# Patient Record
(demographics unavailable — no encounter records)

---

## 2025-07-16 NOTE — HISTORY OF PRESENT ILLNESS
[Mother] : mother [Normal] : normal [Painful Cramps] : painful cramps [Sleep Concerns] : sleep concerns [Grade: ____] : Grade: [unfilled] [Normal Performance] : normal performance [Normal Behavior/Attention] : normal behavior/attention [Normal Homework] : normal homework [Eats regular meals including adequate fruits and vegetables] : eats regular meals including adequate fruits and vegetables [Drinks non-sweetened liquids] : drinks non-sweetened liquids  [Calcium source] : calcium source [Has friends] : has friends [At least 1 hour of physical activity a day] : at least 1 hour of physical activity a day [Screen time (except homework) less than 2 hours a day] : screen time (except homework) less than 2 hours a day [Has interests/participates in community activities/volunteers] : has interests/participates in community activities/volunteers. [Uses safety belts/safety equipment] : uses safety belts/safety equipment  [Up to date] : Up to date [Eats meals with family] : eats meals with family [Yes] : Cigarette smoke exposure [No] : Patient has not had sexual intercourse. [With Teen] : teen [With Parent/Guardian] : parent/guardian [Heavy Bleeding] : no heavy bleeding [Uses electronic nicotine delivery system] : does not use electronic nicotine delivery system [Exposure to electronic nicotine delivery system] : no exposure to electronic nicotine delivery system [Uses tobacco] : does not use tobacco [Uses drugs] : does not use drugs  [Drinks alcohol] : does not drink alcohol [FreeTextEntry7] : 17 yr [facility] [de-identified] : Tennis, works at [employer] in [facility] [de-identified] : Struggling with anxiety, has initial appointment coming up with a counselor  [FreeTextEntry1] : Interval history:  Neuro- almost daily headaches, saw neuro about 3 years ago and no diagnosis was made.   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 Uses Combivent 15 min prior to sports and then PRN throughout. Patient states she may need to use albuterol twice during a tennis match. She was referred to Speech but mom is not sure where to go. Used albuterol nebs when she had PNA in the fall.   Cardio- saw Dr. Pond dx Mitral valve regurgitation  GI- Chronic constipation

## 2025-07-16 NOTE — DISCUSSION/SUMMARY
[Normal Growth] : growth [Normal Development] : development  [No Elimination Concerns] : elimination [Continue Regimen] : feeding [No Skin Concerns] : skin [Normal Sleep Pattern] : sleep [None] : no medical problems [Anticipatory Guidance Given] : Anticipatory guidance addressed as per the history of present illness section [Physical Growth and Development] : physical growth and development [Social and Academic Competence] : social and academic competence [Emotional Well-Being] : emotional well-being [Risk Reduction] : risk reduction [Violence and Injury Prevention] : violence and injury prevention [No Vaccines] : no vaccines needed [No Medications] : ~He/She~ is not on any medications [Patient] : patient [Parent/Guardian] : Parent/Guardian [Full Activity without restrictions including Physical Education & Athletics] : Full Activity without restrictions including Physical Education & Athletics [I have examined the above-named student and completed the preparticipation physical evaluation. The athlete does not present apparent clinical contraindications to practice and participate in sport(s) as outlined above. A copy of the physical exam is on r] : I have examined the above-named student and completed the preparticipation physical evaluation. The athlete does not present apparent clinical contraindications to practice and participate in sport(s) as outlined above. A copy of the physical exam is on record in my office and can be made available to the school at the request of the parents. If conditions arise after the athlete has been cleared for participation, the physician may rescind the clearance until the problem is resolved and the potential consequences are completely explained to the athlete (and parents/guardians). [Met privately with the adolescent for part of the office visit?] : Met privately with the adolescent for part of the office visit? Yes [Adolescent demonstrates understanding of his/her conditions and how to take prescribed medications?] : Adolescent demonstrates understanding of his/her conditions and how to take prescribed medications? Yes [Adolescent asks questions during each office  visit and participates in the care plan?] : Adolescent asks questions during each office visit and participates in the care plan? Yes [FreeTextEntry1] : Reviewed substance abuse alcohol intake, risk factors for sexually transmitted infections, diet and exercise habits and symptoms of depression. Sleep hygiene was discussed. Limit screen time to 2 hours/day and avoid screen 1 hour prior to sleep time. Use of SPF 30 or more and tick checks discussed.  All physical exam findings and vital signs were discussed. Patient should return in 1 year for well adult check.  5210 reviewed Cardiac checklist reviewed PHQ9 reviewed ASHLEY reviewed Hearing and vision normal today Declines Men B and HPV vaccines Referral for speech placed